# Patient Record
Sex: FEMALE | Race: BLACK OR AFRICAN AMERICAN | NOT HISPANIC OR LATINO | URBAN - METROPOLITAN AREA
[De-identification: names, ages, dates, MRNs, and addresses within clinical notes are randomized per-mention and may not be internally consistent; named-entity substitution may affect disease eponyms.]

---

## 2022-02-21 ENCOUNTER — OFFICE VISIT (OUTPATIENT)
Dept: INTERNAL MEDICINE | Facility: CLINIC | Age: 22
End: 2022-02-21
Payer: COMMERCIAL

## 2022-02-21 ENCOUNTER — LAB VISIT (OUTPATIENT)
Dept: LAB | Facility: HOSPITAL | Age: 22
End: 2022-02-21
Payer: MEDICAID

## 2022-02-21 VITALS
HEART RATE: 82 BPM | SYSTOLIC BLOOD PRESSURE: 118 MMHG | BODY MASS INDEX: 21.26 KG/M2 | OXYGEN SATURATION: 98 % | HEIGHT: 65 IN | WEIGHT: 127.63 LBS | DIASTOLIC BLOOD PRESSURE: 70 MMHG

## 2022-02-21 DIAGNOSIS — F32.A DEPRESSION, UNSPECIFIED DEPRESSION TYPE: ICD-10-CM

## 2022-02-21 DIAGNOSIS — Z00.00 ROUTINE PHYSICAL EXAMINATION: Primary | ICD-10-CM

## 2022-02-21 DIAGNOSIS — Z00.00 ROUTINE PHYSICAL EXAMINATION: ICD-10-CM

## 2022-02-21 LAB
ANION GAP SERPL CALC-SCNC: 9 MMOL/L (ref 8–16)
BASOPHILS # BLD AUTO: 0.07 K/UL (ref 0–0.2)
BASOPHILS NFR BLD: 1 % (ref 0–1.9)
BUN SERPL-MCNC: 11 MG/DL (ref 6–20)
CALCIUM SERPL-MCNC: 9 MG/DL (ref 8.7–10.5)
CHLORIDE SERPL-SCNC: 106 MMOL/L (ref 95–110)
CHOLEST SERPL-MCNC: 162 MG/DL (ref 120–199)
CHOLEST/HDLC SERPL: 2.7 {RATIO} (ref 2–5)
CO2 SERPL-SCNC: 24 MMOL/L (ref 23–29)
CREAT SERPL-MCNC: 0.7 MG/DL (ref 0.5–1.4)
DIFFERENTIAL METHOD: ABNORMAL
EOSINOPHIL # BLD AUTO: 0.1 K/UL (ref 0–0.5)
EOSINOPHIL NFR BLD: 1.3 % (ref 0–8)
ERYTHROCYTE [DISTWIDTH] IN BLOOD BY AUTOMATED COUNT: 12.2 % (ref 11.5–14.5)
EST. GFR  (AFRICAN AMERICAN): >60 ML/MIN/1.73 M^2
EST. GFR  (NON AFRICAN AMERICAN): >60 ML/MIN/1.73 M^2
GLUCOSE SERPL-MCNC: 81 MG/DL (ref 70–110)
HCT VFR BLD AUTO: 41.6 % (ref 37–48.5)
HDLC SERPL-MCNC: 61 MG/DL (ref 40–75)
HDLC SERPL: 37.7 % (ref 20–50)
HGB BLD-MCNC: 13.1 G/DL (ref 12–16)
IMM GRANULOCYTES # BLD AUTO: 0.02 K/UL (ref 0–0.04)
IMM GRANULOCYTES NFR BLD AUTO: 0.3 % (ref 0–0.5)
LDLC SERPL CALC-MCNC: 92.2 MG/DL (ref 63–159)
LYMPHOCYTES # BLD AUTO: 1.8 K/UL (ref 1–4.8)
LYMPHOCYTES NFR BLD: 25.4 % (ref 18–48)
MCH RBC QN AUTO: 32.1 PG (ref 27–31)
MCHC RBC AUTO-ENTMCNC: 31.5 G/DL (ref 32–36)
MCV RBC AUTO: 102 FL (ref 82–98)
MONOCYTES # BLD AUTO: 0.5 K/UL (ref 0.3–1)
MONOCYTES NFR BLD: 7.7 % (ref 4–15)
NEUTROPHILS # BLD AUTO: 4.5 K/UL (ref 1.8–7.7)
NEUTROPHILS NFR BLD: 64.3 % (ref 38–73)
NONHDLC SERPL-MCNC: 101 MG/DL
NRBC BLD-RTO: 0 /100 WBC
PLATELET # BLD AUTO: 273 K/UL (ref 150–450)
PMV BLD AUTO: 10.9 FL (ref 9.2–12.9)
POTASSIUM SERPL-SCNC: 4.2 MMOL/L (ref 3.5–5.1)
RBC # BLD AUTO: 4.08 M/UL (ref 4–5.4)
SODIUM SERPL-SCNC: 139 MMOL/L (ref 136–145)
TRIGL SERPL-MCNC: 44 MG/DL (ref 30–150)
TSH SERPL DL<=0.005 MIU/L-ACNC: 1.2 UIU/ML (ref 0.4–4)
WBC # BLD AUTO: 7 K/UL (ref 3.9–12.7)

## 2022-02-21 PROCEDURE — 99385 PREV VISIT NEW AGE 18-39: CPT | Mod: S$GLB,,, | Performed by: PHYSICIAN ASSISTANT

## 2022-02-21 PROCEDURE — 1160F PR REVIEW ALL MEDS BY PRESCRIBER/CLIN PHARMACIST DOCUMENTED: ICD-10-PCS | Mod: CPTII,S$GLB,, | Performed by: PHYSICIAN ASSISTANT

## 2022-02-21 PROCEDURE — 3074F PR MOST RECENT SYSTOLIC BLOOD PRESSURE < 130 MM HG: ICD-10-PCS | Mod: CPTII,S$GLB,, | Performed by: PHYSICIAN ASSISTANT

## 2022-02-21 PROCEDURE — 85025 COMPLETE CBC W/AUTO DIFF WBC: CPT | Performed by: PHYSICIAN ASSISTANT

## 2022-02-21 PROCEDURE — 3074F SYST BP LT 130 MM HG: CPT | Mod: CPTII,S$GLB,, | Performed by: PHYSICIAN ASSISTANT

## 2022-02-21 PROCEDURE — 99385 PR PREVENTIVE VISIT,NEW,18-39: ICD-10-PCS | Mod: S$GLB,,, | Performed by: PHYSICIAN ASSISTANT

## 2022-02-21 PROCEDURE — 99999 PR PBB SHADOW E&M-NEW PATIENT-LVL IV: CPT | Mod: PBBFAC,,, | Performed by: PHYSICIAN ASSISTANT

## 2022-02-21 PROCEDURE — 3078F DIAST BP <80 MM HG: CPT | Mod: CPTII,S$GLB,, | Performed by: PHYSICIAN ASSISTANT

## 2022-02-21 PROCEDURE — 3078F PR MOST RECENT DIASTOLIC BLOOD PRESSURE < 80 MM HG: ICD-10-PCS | Mod: CPTII,S$GLB,, | Performed by: PHYSICIAN ASSISTANT

## 2022-02-21 PROCEDURE — 87389 HIV-1 AG W/HIV-1&-2 AB AG IA: CPT | Performed by: PHYSICIAN ASSISTANT

## 2022-02-21 PROCEDURE — 1160F RVW MEDS BY RX/DR IN RCRD: CPT | Mod: CPTII,S$GLB,, | Performed by: PHYSICIAN ASSISTANT

## 2022-02-21 PROCEDURE — 1159F PR MEDICATION LIST DOCUMENTED IN MEDICAL RECORD: ICD-10-PCS | Mod: CPTII,S$GLB,, | Performed by: PHYSICIAN ASSISTANT

## 2022-02-21 PROCEDURE — 3008F BODY MASS INDEX DOCD: CPT | Mod: CPTII,S$GLB,, | Performed by: PHYSICIAN ASSISTANT

## 2022-02-21 PROCEDURE — 80061 LIPID PANEL: CPT | Performed by: PHYSICIAN ASSISTANT

## 2022-02-21 PROCEDURE — 1159F MED LIST DOCD IN RCRD: CPT | Mod: CPTII,S$GLB,, | Performed by: PHYSICIAN ASSISTANT

## 2022-02-21 PROCEDURE — 80048 BASIC METABOLIC PNL TOTAL CA: CPT | Performed by: PHYSICIAN ASSISTANT

## 2022-02-21 PROCEDURE — 3008F PR BODY MASS INDEX (BMI) DOCUMENTED: ICD-10-PCS | Mod: CPTII,S$GLB,, | Performed by: PHYSICIAN ASSISTANT

## 2022-02-21 PROCEDURE — 36415 COLL VENOUS BLD VENIPUNCTURE: CPT | Performed by: PHYSICIAN ASSISTANT

## 2022-02-21 PROCEDURE — 84443 ASSAY THYROID STIM HORMONE: CPT | Performed by: PHYSICIAN ASSISTANT

## 2022-02-21 PROCEDURE — 99999 PR PBB SHADOW E&M-NEW PATIENT-LVL IV: ICD-10-PCS | Mod: PBBFAC,,, | Performed by: PHYSICIAN ASSISTANT

## 2022-02-21 RX ORDER — MEDROXYPROGESTERONE ACETATE 150 MG/ML
INJECTION, SUSPENSION INTRAMUSCULAR
COMMUNITY
Start: 2021-12-03

## 2022-02-21 RX ORDER — ESCITALOPRAM OXALATE 5 MG/1
5 TABLET ORAL DAILY
Qty: 30 TABLET | Refills: 1 | Status: SHIPPED | OUTPATIENT
Start: 2022-02-21 | End: 2022-03-24 | Stop reason: DRUGHIGH

## 2022-02-21 NOTE — PROGRESS NOTES
"Subjective:       Patient ID: Jadon Oconnell is a 21 y.o. female.    Chief Complaint: Annual Exam      Established pt of Primary Doctor No (new to me)    HPI        Here for annual exam.     [place] moved [place] the area form [place] , Nursing student at [employer].   Concerns of depressed mood for over the past 3 to 4 years. Saw a therapist in the past for a couple of months. She was not interested in medication at that time but is now interested was she is feeling more down, low energy, irritable, decreased concentration, sleeping less, crying spells, increased anxiety. Notes her grandmother passed away 2 weeks ago. No SI/HI. Has great support from her parents.       Past Medical History:   Diagnosis Date    Depression             Review of patient's allergies indicates:  No Known Allergies      Current Outpatient Medications:     medroxyPROGESTERone (DEPO-PROVERA) 150 mg/mL Syrg, ADMINISTER 1 SYRINGE IN THE MUSCLE EVERY 12 WEEKS, Disp: , Rfl: (follows with ELOISE Andrade)    Family History   Problem Relation Age of Onset    Hypertension Mother     Dementia Maternal Grandmother     Hypertension Maternal Grandfather     Seizures Maternal Grandfather     Stroke Maternal Grandfather        History reviewed. No pertinent surgical history.    Review of Systems   Constitutional: Negative for chills, fever and unexpected weight change.   Respiratory: Negative for cough, shortness of breath and wheezing.    Cardiovascular: Negative for chest pain and leg swelling.   Gastrointestinal: Negative for abdominal pain, nausea and vomiting.   Integumentary:  Negative for rash.   Neurological: Negative for weakness and headaches.       Objective: /70 (BP Location: Right arm, Patient Position: Sitting, BP Method: Medium (Manual))   Pulse 82   Ht 5' 5" (1.651 m)   Wt 57.9 kg (127 lb 10.3 oz)   SpO2 98%   BMI 21.24 kg/m²         Physical Exam  Vitals reviewed.   Constitutional:       General: She is not in " acute distress.     Appearance: She is well-developed.   HENT:      Head: Normocephalic and atraumatic.   Eyes:      Pupils: Pupils are equal, round, and reactive to light.   Cardiovascular:      Rate and Rhythm: Normal rate and regular rhythm.      Heart sounds: No murmur heard.  Pulmonary:      Effort: Pulmonary effort is normal.      Breath sounds: Normal breath sounds. No wheezing or rales.   Abdominal:      General: Bowel sounds are normal.      Palpations: Abdomen is soft.      Tenderness: There is no abdominal tenderness.   Skin:     General: Skin is warm and dry.      Findings: No rash.   Neurological:      Mental Status: She is alert.   Psychiatric:         Attention and Perception: Attention normal.         Mood and Affect: Mood is depressed. Affect is tearful.         Speech: Speech normal.         Behavior: Behavior normal.         Thought Content: Thought content normal.         Assessment:       1. Routine physical examination    2. Depression, unspecified depression type        Plan:             Jadon was seen today for annual exam.    Diagnoses and all orders for this visit:    Routine physical examination  HM reviewed and udpated  -     Basic Metabolic Panel; Future  -     CBC Auto Differential; Future  -     TSH; Future  -     Lipid Panel; Future  -     HIV 1/2 Ag/Ab (4th Gen); Future  -     C. trachomatis/N. gonorrhoeae by AMP DNA [facility]; Urine; Future    Depression, unspecified depression type  start lexapro low dose as below, discussed side effects  F/u in 4 weeks for med eval with me or new PCP  Contact info provided for scheduling therapy appt.   -     Ambulatory referral/consult to Psychiatry; Future  -     EScitalopram oxalate (LEXAPRO) 5 MG Tab; Take 1 tablet (5 mg total) by mouth once daily.          Hien Coombs PA-C

## 2022-02-21 NOTE — PATIENT INSTRUCTIONS
[phone] for therapy      SCHEDULE APPOINTMENT WITH A MD TO FULLY ESTABLISH CARE WITHIN THE NEXT FEW MONTHS    Regan Valeriy ([facility] [place] [address]):  - Kamaljit Flores M.D.  - Char Rai M.D  - Andrez Card M.D.  - Quentin Bravo M.D.  - Marycarmen Carbajal M.D.

## 2022-02-23 LAB — HIV 1+2 AB+HIV1 P24 AG SERPL QL IA: NEGATIVE

## 2022-03-24 ENCOUNTER — OFFICE VISIT (OUTPATIENT)
Dept: INTERNAL MEDICINE | Facility: CLINIC | Age: 22
End: 2022-03-24
Payer: COMMERCIAL

## 2022-03-24 VITALS
SYSTOLIC BLOOD PRESSURE: 132 MMHG | WEIGHT: 125.44 LBS | HEART RATE: 85 BPM | HEIGHT: 65 IN | DIASTOLIC BLOOD PRESSURE: 70 MMHG | OXYGEN SATURATION: 99 % | BODY MASS INDEX: 20.9 KG/M2

## 2022-03-24 DIAGNOSIS — F32.A DEPRESSION, UNSPECIFIED DEPRESSION TYPE: ICD-10-CM

## 2022-03-24 PROCEDURE — 3008F PR BODY MASS INDEX (BMI) DOCUMENTED: ICD-10-PCS | Mod: CPTII,S$GLB,, | Performed by: PHYSICIAN ASSISTANT

## 2022-03-24 PROCEDURE — 3078F DIAST BP <80 MM HG: CPT | Mod: CPTII,S$GLB,, | Performed by: PHYSICIAN ASSISTANT

## 2022-03-24 PROCEDURE — 1160F PR REVIEW ALL MEDS BY PRESCRIBER/CLIN PHARMACIST DOCUMENTED: ICD-10-PCS | Mod: CPTII,S$GLB,, | Performed by: PHYSICIAN ASSISTANT

## 2022-03-24 PROCEDURE — 99999 PR PBB SHADOW E&M-EST. PATIENT-LVL IV: CPT | Mod: PBBFAC,,, | Performed by: PHYSICIAN ASSISTANT

## 2022-03-24 PROCEDURE — 99999 PR PBB SHADOW E&M-EST. PATIENT-LVL IV: ICD-10-PCS | Mod: PBBFAC,,, | Performed by: PHYSICIAN ASSISTANT

## 2022-03-24 PROCEDURE — 3008F BODY MASS INDEX DOCD: CPT | Mod: CPTII,S$GLB,, | Performed by: PHYSICIAN ASSISTANT

## 2022-03-24 PROCEDURE — 1160F RVW MEDS BY RX/DR IN RCRD: CPT | Mod: CPTII,S$GLB,, | Performed by: PHYSICIAN ASSISTANT

## 2022-03-24 PROCEDURE — 3075F SYST BP GE 130 - 139MM HG: CPT | Mod: CPTII,S$GLB,, | Performed by: PHYSICIAN ASSISTANT

## 2022-03-24 PROCEDURE — 1159F MED LIST DOCD IN RCRD: CPT | Mod: CPTII,S$GLB,, | Performed by: PHYSICIAN ASSISTANT

## 2022-03-24 PROCEDURE — 99213 PR OFFICE/OUTPT VISIT, EST, LEVL III, 20-29 MIN: ICD-10-PCS | Mod: S$GLB,,, | Performed by: PHYSICIAN ASSISTANT

## 2022-03-24 PROCEDURE — 3075F PR MOST RECENT SYSTOLIC BLOOD PRESS GE 130-139MM HG: ICD-10-PCS | Mod: CPTII,S$GLB,, | Performed by: PHYSICIAN ASSISTANT

## 2022-03-24 PROCEDURE — 1159F PR MEDICATION LIST DOCUMENTED IN MEDICAL RECORD: ICD-10-PCS | Mod: CPTII,S$GLB,, | Performed by: PHYSICIAN ASSISTANT

## 2022-03-24 PROCEDURE — 3078F PR MOST RECENT DIASTOLIC BLOOD PRESSURE < 80 MM HG: ICD-10-PCS | Mod: CPTII,S$GLB,, | Performed by: PHYSICIAN ASSISTANT

## 2022-03-24 PROCEDURE — 99213 OFFICE O/P EST LOW 20 MIN: CPT | Mod: S$GLB,,, | Performed by: PHYSICIAN ASSISTANT

## 2022-03-24 RX ORDER — ESCITALOPRAM OXALATE 10 MG/1
10 TABLET ORAL DAILY
Qty: 30 TABLET | Refills: 2 | Status: SHIPPED | OUTPATIENT
Start: 2022-03-24 | End: 2023-04-12

## 2022-03-24 NOTE — PROGRESS NOTES
"Subjective:       Patient ID: Jadon Oconnell is a 21 y.o. female.    Chief Complaint: Follow-up    HPI     Established pt of Primary Doctor No     Here for follow up after starting lexapro 5mg.   She reports things are better. More calm, less crying spells, less irriritable and aggravated Tolerating medication well. 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 Interested in increasing the dose of lexapro    Past Medical History:   Diagnosis Date    Depression      Social History     Tobacco Use    Smoking status: Never Smoker    Smokeless tobacco: Never Used   Substance Use Topics    Alcohol use: Yes     Comment: socially    Drug use: Never     Review of patient's allergies indicates:  No Known Allergies        Review of Systems   Constitutional: Negative for chills, fever and unexpected weight change.   Respiratory: Negative for cough and shortness of breath.    Cardiovascular: Negative for chest pain and leg swelling.   Gastrointestinal: Negative for abdominal pain, nausea and vomiting.   Integumentary:  Negative for rash.   Neurological: Negative for weakness and headaches.         Objective: /70 (BP Location: Left arm, Patient Position: Sitting, BP Method: Medium (Manual))   Pulse 85   Ht 5' 5" (1.651 m)   Wt 56.9 kg (125 lb 7.1 oz)   SpO2 99%   BMI 20.87 kg/m²         Physical Exam  Constitutional:       General: She is not in acute distress.     Appearance: She is normal weight. She is not ill-appearing.   Cardiovascular:      Rate and Rhythm: Normal rate and regular rhythm.   Pulmonary:      Effort: Pulmonary effort is normal.      Breath sounds: Normal breath sounds.   Musculoskeletal:      Right lower leg: No edema.      Left lower leg: No edema.   Lymphadenopathy:      Cervical: No cervical adenopathy.   Neurological:      Mental Status: She is alert.   Psychiatric:         Mood and Affect: Mood normal.         Behavior: Behavior normal.         Thought Content: Thought content normal.         Judgment: " Judgment normal.         Assessment:       Problem List Items Addressed This Visit    None     Visit Diagnoses     Depression, unspecified depression type        Relevant Medications    EScitalopram oxalate (LEXAPRO) 10 MG tablet          Plan:         Jadon was seen today for follow-up.    Diagnoses and all orders for this visit:    Depression, unspecified depression type  Improving  Increase lexapro from 5mg to 10mg  Virtual visit f/u in 8 week  -     EScitalopram oxalate (LEXAPRO) 10 MG tablet; Take 1 tablet (10 mg total) by mouth once daily.        Hien Coombs PA-C

## 2023-04-12 ENCOUNTER — LAB VISIT (OUTPATIENT)
Dept: LAB | Facility: HOSPITAL | Age: 23
End: 2023-04-12
Attending: INTERNAL MEDICINE
Payer: COMMERCIAL

## 2023-04-12 ENCOUNTER — OFFICE VISIT (OUTPATIENT)
Dept: PRIMARY CARE CLINIC | Facility: CLINIC | Age: 23
End: 2023-04-12
Payer: COMMERCIAL

## 2023-04-12 VITALS
HEIGHT: 65 IN | OXYGEN SATURATION: 99 % | DIASTOLIC BLOOD PRESSURE: 76 MMHG | WEIGHT: 125.25 LBS | HEART RATE: 81 BPM | SYSTOLIC BLOOD PRESSURE: 110 MMHG | BODY MASS INDEX: 20.87 KG/M2

## 2023-04-12 DIAGNOSIS — R07.89 CHEST TIGHTNESS: ICD-10-CM

## 2023-04-12 DIAGNOSIS — Z11.59 SCREENING FOR VIRAL DISEASE: ICD-10-CM

## 2023-04-12 DIAGNOSIS — F51.01 PRIMARY INSOMNIA: ICD-10-CM

## 2023-04-12 DIAGNOSIS — F33.41 RECURRENT MAJOR DEPRESSIVE DISORDER, IN PARTIAL REMISSION: ICD-10-CM

## 2023-04-12 DIAGNOSIS — Z00.00 ANNUAL PHYSICAL EXAM: ICD-10-CM

## 2023-04-12 DIAGNOSIS — Z00.00 ANNUAL PHYSICAL EXAM: Primary | ICD-10-CM

## 2023-04-12 LAB
ALBUMIN SERPL BCP-MCNC: 3.9 G/DL (ref 3.5–5.2)
ALP SERPL-CCNC: 52 U/L (ref 55–135)
ALT SERPL W/O P-5'-P-CCNC: 10 U/L (ref 10–44)
ANION GAP SERPL CALC-SCNC: 10 MMOL/L (ref 8–16)
AST SERPL-CCNC: 10 U/L (ref 10–40)
BASOPHILS # BLD AUTO: 0.03 K/UL (ref 0–0.2)
BASOPHILS NFR BLD: 0.6 % (ref 0–1.9)
BILIRUB SERPL-MCNC: 0.4 MG/DL (ref 0.1–1)
BUN SERPL-MCNC: 7 MG/DL (ref 6–20)
CALCIUM SERPL-MCNC: 8.9 MG/DL (ref 8.7–10.5)
CHLORIDE SERPL-SCNC: 108 MMOL/L (ref 95–110)
CHOLEST SERPL-MCNC: 131 MG/DL (ref 120–199)
CHOLEST/HDLC SERPL: 3 {RATIO} (ref 2–5)
CO2 SERPL-SCNC: 23 MMOL/L (ref 23–29)
CREAT SERPL-MCNC: 0.6 MG/DL (ref 0.5–1.4)
DIFFERENTIAL METHOD: ABNORMAL
EOSINOPHIL # BLD AUTO: 0.1 K/UL (ref 0–0.5)
EOSINOPHIL NFR BLD: 2.3 % (ref 0–8)
ERYTHROCYTE [DISTWIDTH] IN BLOOD BY AUTOMATED COUNT: 12.1 % (ref 11.5–14.5)
EST. GFR  (NO RACE VARIABLE): >60 ML/MIN/1.73 M^2
ESTIMATED AVG GLUCOSE: 94 MG/DL (ref 68–131)
GLUCOSE SERPL-MCNC: 81 MG/DL (ref 70–110)
HBA1C MFR BLD: 4.9 % (ref 4–5.6)
HCT VFR BLD AUTO: 39.8 % (ref 37–48.5)
HCV AB SERPL QL IA: NORMAL
HDLC SERPL-MCNC: 44 MG/DL (ref 40–75)
HDLC SERPL: 33.6 % (ref 20–50)
HGB BLD-MCNC: 12.9 G/DL (ref 12–16)
IMM GRANULOCYTES # BLD AUTO: 0.01 K/UL (ref 0–0.04)
IMM GRANULOCYTES NFR BLD AUTO: 0.2 % (ref 0–0.5)
LDLC SERPL CALC-MCNC: 76.6 MG/DL (ref 63–159)
LYMPHOCYTES # BLD AUTO: 1.7 K/UL (ref 1–4.8)
LYMPHOCYTES NFR BLD: 34.5 % (ref 18–48)
MCH RBC QN AUTO: 31.9 PG (ref 27–31)
MCHC RBC AUTO-ENTMCNC: 32.4 G/DL (ref 32–36)
MCV RBC AUTO: 98 FL (ref 82–98)
MONOCYTES # BLD AUTO: 0.6 K/UL (ref 0.3–1)
MONOCYTES NFR BLD: 12.6 % (ref 4–15)
NEUTROPHILS # BLD AUTO: 2.4 K/UL (ref 1.8–7.7)
NEUTROPHILS NFR BLD: 49.8 % (ref 38–73)
NONHDLC SERPL-MCNC: 87 MG/DL
NRBC BLD-RTO: 0 /100 WBC
PLATELET # BLD AUTO: 231 K/UL (ref 150–450)
PMV BLD AUTO: 10.7 FL (ref 9.2–12.9)
POTASSIUM SERPL-SCNC: 3.8 MMOL/L (ref 3.5–5.1)
PROT SERPL-MCNC: 6.9 G/DL (ref 6–8.4)
RBC # BLD AUTO: 4.05 M/UL (ref 4–5.4)
SODIUM SERPL-SCNC: 141 MMOL/L (ref 136–145)
TRIGL SERPL-MCNC: 52 MG/DL (ref 30–150)
TSH SERPL DL<=0.005 MIU/L-ACNC: 1.49 UIU/ML (ref 0.4–4)
WBC # BLD AUTO: 4.78 K/UL (ref 3.9–12.7)

## 2023-04-12 PROCEDURE — 80053 COMPREHEN METABOLIC PANEL: CPT | Performed by: INTERNAL MEDICINE

## 2023-04-12 PROCEDURE — 86803 HEPATITIS C AB TEST: CPT | Performed by: INTERNAL MEDICINE

## 2023-04-12 PROCEDURE — 80061 LIPID PANEL: CPT | Performed by: INTERNAL MEDICINE

## 2023-04-12 PROCEDURE — 99999 PR PBB SHADOW E&M-EST. PATIENT-LVL III: CPT | Mod: PBBFAC,,, | Performed by: INTERNAL MEDICINE

## 2023-04-12 PROCEDURE — 1159F PR MEDICATION LIST DOCUMENTED IN MEDICAL RECORD: ICD-10-PCS | Mod: CPTII,S$GLB,, | Performed by: INTERNAL MEDICINE

## 2023-04-12 PROCEDURE — 99395 PR PREVENTIVE VISIT,EST,18-39: ICD-10-PCS | Mod: S$GLB,,, | Performed by: INTERNAL MEDICINE

## 2023-04-12 PROCEDURE — 3074F SYST BP LT 130 MM HG: CPT | Mod: CPTII,S$GLB,, | Performed by: INTERNAL MEDICINE

## 2023-04-12 PROCEDURE — 84443 ASSAY THYROID STIM HORMONE: CPT | Performed by: INTERNAL MEDICINE

## 2023-04-12 PROCEDURE — 3078F PR MOST RECENT DIASTOLIC BLOOD PRESSURE < 80 MM HG: ICD-10-PCS | Mod: CPTII,S$GLB,, | Performed by: INTERNAL MEDICINE

## 2023-04-12 PROCEDURE — 36415 COLL VENOUS BLD VENIPUNCTURE: CPT | Performed by: INTERNAL MEDICINE

## 2023-04-12 PROCEDURE — 3074F PR MOST RECENT SYSTOLIC BLOOD PRESSURE < 130 MM HG: ICD-10-PCS | Mod: CPTII,S$GLB,, | Performed by: INTERNAL MEDICINE

## 2023-04-12 PROCEDURE — 3078F DIAST BP <80 MM HG: CPT | Mod: CPTII,S$GLB,, | Performed by: INTERNAL MEDICINE

## 2023-04-12 PROCEDURE — 85025 COMPLETE CBC W/AUTO DIFF WBC: CPT | Performed by: INTERNAL MEDICINE

## 2023-04-12 PROCEDURE — 1159F MED LIST DOCD IN RCRD: CPT | Mod: CPTII,S$GLB,, | Performed by: INTERNAL MEDICINE

## 2023-04-12 PROCEDURE — 3008F BODY MASS INDEX DOCD: CPT | Mod: CPTII,S$GLB,, | Performed by: INTERNAL MEDICINE

## 2023-04-12 PROCEDURE — 99395 PREV VISIT EST AGE 18-39: CPT | Mod: S$GLB,,, | Performed by: INTERNAL MEDICINE

## 2023-04-12 PROCEDURE — 83036 HEMOGLOBIN GLYCOSYLATED A1C: CPT | Performed by: INTERNAL MEDICINE

## 2023-04-12 PROCEDURE — 3008F PR BODY MASS INDEX (BMI) DOCUMENTED: ICD-10-PCS | Mod: CPTII,S$GLB,, | Performed by: INTERNAL MEDICINE

## 2023-04-12 PROCEDURE — 99999 PR PBB SHADOW E&M-EST. PATIENT-LVL III: ICD-10-PCS | Mod: PBBFAC,,, | Performed by: INTERNAL MEDICINE

## 2023-04-12 RX ORDER — TRAZODONE HYDROCHLORIDE 50 MG/1
50 TABLET ORAL NIGHTLY
COMMUNITY
End: 2023-04-12 | Stop reason: SDUPTHER

## 2023-04-12 RX ORDER — SERTRALINE HYDROCHLORIDE 50 MG/1
50 TABLET, FILM COATED ORAL DAILY
COMMUNITY
End: 2023-04-12

## 2023-04-12 RX ORDER — SERTRALINE HYDROCHLORIDE 50 MG/1
75 TABLET, FILM COATED ORAL DAILY
Qty: 135 TABLET | Refills: 3 | Status: SHIPPED | OUTPATIENT
Start: 2023-04-12 | End: 2023-09-20

## 2023-04-12 RX ORDER — TRAZODONE HYDROCHLORIDE 100 MG/1
100 TABLET ORAL NIGHTLY
Qty: 90 TABLET | Refills: 3 | Status: SHIPPED | OUTPATIENT
Start: 2023-04-12

## 2023-04-12 NOTE — PROGRESS NOTES
[facility] Note    Chief Complaint      Chief Complaint   Patient presents with    Establish Care     History of Present Illness      Jadon Oconnell is a 22 y.o. female who presents today for Annual preventative visit.  Patient comes to appointment alone.  Derm: Brandy Harris, GYN: Donna Hitchcock    Exercises 3 times per week, does cardio and weights.  Cooks most meals, eats pretty healthy.    Problem List Items Addressed This Visit       Recurrent major depressive disorder, in partial remission    Current Assessment & Plan     Was seeing psychiatry at [facility], but stopped.  Stopped taking sertraline 75 mg daily 2 months ago. No SI/HI/panic attacks.      Has been having chest tightness the last few weeks. No SOB/wheezing.  Feels like there is a band around her chest.  No issues with swallowing. No issues with GERD, heartburn.    Had no emotion on lexapro.             Relevant Medications    sertraline (ZOLOFT) 50 MG tablet     Other Visit Diagnoses       Annual physical exam    -  Primary    Relevant Orders    CBC Auto Differential    Lipid Panel    Comprehensive Metabolic Panel    Hemoglobin A1C    TSH    Primary insomnia        Relevant Medications    traZODone (DESYREL) 100 MG tablet    Chest tightness        Screening for viral disease        Relevant Orders    Hepatitis C Antibody            Health Maintenance   Topic Date Due    Hepatitis C Screening  Never done    Chlamydia Screening  02/21/2023    TETANUS VACCINE  06/18/2023    Lipid Panel  Completed    HPV Vaccines  Completed       Past Medical History:   Diagnosis Date    Depression        History reviewed. No pertinent surgical history.    family history includes Alzheimer's disease in her maternal grandfather; Dementia in her maternal grandmother; Hypertension in her maternal grandfather and mother; Seizures in her maternal grandfather; Stroke in her maternal grandfather.    Social History     Tobacco Use    Smoking  "status: Never    Smokeless tobacco: Never   Substance Use Topics    Alcohol use: Yes     Comment: socially    Drug use: Never       Review of Systems   Constitutional:  Negative for chills and fever.   Respiratory:  Negative for cough and shortness of breath.    Cardiovascular:  Negative for chest pain and palpitations.   Gastrointestinal:  Negative for constipation, diarrhea, nausea and vomiting.   Genitourinary:  Negative for dysuria and hematuria.   Musculoskeletal:  Negative for falls.   Neurological:  Negative for headaches.      Outpatient Encounter Medications as of 4/12/2023   Medication Sig Dispense Refill    medroxyPROGESTERone (DEPO-PROVERA) 150 mg/mL Syrg ADMINISTER 1 SYRINGE IN THE MUSCLE EVERY 12 WEEKS      [DISCONTINUED] sertraline (ZOLOFT) 50 MG tablet Take 50 mg by mouth once daily. 1.5 tabs po qday      [DISCONTINUED] traZODone (DESYREL) 50 MG tablet Take 50 mg by mouth every evening.      sertraline (ZOLOFT) 50 MG tablet Take 1.5 tablets (75 mg total) by mouth once daily. 1.5 tabs po qday 135 tablet 3    traZODone (DESYREL) 100 MG tablet Take 1 tablet (100 mg total) by mouth every evening. 90 tablet 3    [DISCONTINUED] EScitalopram oxalate (LEXAPRO) 10 MG tablet Take 1 tablet (10 mg total) by mouth once daily. (Patient not taking: Reported on 4/12/2023) 30 tablet 2     No facility-administered encounter medications on file as of 4/12/2023.        Review of patient's allergies indicates:  No Known Allergies    Physical Exam      Vital Signs  Pulse: 81  SpO2: 99 %  BP: 110/76  Pain Score: 0-No pain  Height and Weight  Height: 5' 5" (165.1 cm)  Weight: 56.8 kg (125 lb 3.5 oz)  BSA (Calculated - sq m): 1.61 sq meters  BMI (Calculated): 20.8  Weight in (lb) to have BMI = 25: 149.9]    Physical Exam  Constitutional:       Appearance: She is well-developed.   HENT:      Head: Normocephalic and atraumatic.   Cardiovascular:      Rate and Rhythm: Normal rate and regular rhythm.      Heart sounds: Normal " heart sounds. No murmur heard.  Pulmonary:      Effort: Pulmonary effort is normal. No respiratory distress.      Breath sounds: Normal breath sounds.   Abdominal:      General: There is no distension.      Palpations: Abdomen is soft.      Tenderness: There is no abdominal tenderness. There is no guarding.   Skin:     General: Skin is warm and dry.   Neurological:      Mental Status: She is alert. Mental status is at baseline.   Psychiatric:         Behavior: Behavior normal.        Laboratory:  CBC:  No results for input(s): WBC, RBC, HGB, HCT, PLT, MCV, MCH, MCHC in the last 2160 hours.  CMP:  No results for input(s): GLU, CALCIUM, ALBUMIN, PROT, NA, K, CO2, CL, BUN, ALKPHOS, ALT, AST, BILITOT in the last 2160 hours.    Invalid input(s): CREATININ  URINALYSIS:  No results for input(s): COLORU, CLARITYU, SPECGRAV, PHUR, PROTEINUA, GLUCOSEU, BILIRUBINCON, BLOODU, WBCU, RBCU, BACTERIA, MUCUS, NITRITE, LEUKOCYTESUR, UROBILINOGEN, HYALINECASTS in the last 2160 hours.   LIPIDS:  No results for input(s): TSH, HDL, CHOL, TRIG, LDLCALC, CHOLHDL, NONHDLCHOL, TOTALCHOLEST in the last 2160 hours.  TSH:  No results for input(s): TSH in the last 2160 hours.  A1C:  No results for input(s): HGBA1C in the last 2160 hours.    Radiology:  No results found in the last 30 days.     Assessment/Plan     Jadon Oconnell is a 22 y.o.female with:    1. Annual physical exam  - CBC Auto Differential; Future  - Lipid Panel; Future  - Comprehensive Metabolic Panel; Future  - Hemoglobin A1C; Future  - TSH; Future    2. Recurrent major depressive disorder, in partial remission  - sertraline (ZOLOFT) 50 MG tablet; Take 1.5 tablets (75 mg total) by mouth once daily. 1.5 tabs po qday  Dispense: 135 tablet; Refill: 3    3. Primary insomnia  - traZODone (DESYREL) 100 MG tablet; Take 1 tablet (100 mg total) by mouth every evening.  Dispense: 90 tablet; Refill: 3    4. Chest tightness    5. Screening for viral disease  - Hepatitis C Antibody;  Future    -increase Trazodone to 100 mg to help with sleep  -chest tightness likely 2/2 anxiety, restart Sertraline  -labs ordered  -Continue current medications and maintain follow up with specialists.    -Follow up in about 5 weeks (around 5/17/2023) for follow up of medical problems.       Mildred Morales MD  [facility]

## 2023-04-12 NOTE — ASSESSMENT & PLAN NOTE
Was seeing psychiatry at [facility], but stopped.  Stopped taking sertraline 75 mg daily 2 months ago. No SI/HI/panic attacks.      Has been having chest tightness the last few weeks. No SOB/wheezing.  Feels like there is a band around her chest.  No issues with swallowing. No issues with GERD, heartburn.    Had no emotion on lexapro.

## 2023-07-10 ENCOUNTER — HOSPITAL ENCOUNTER (EMERGENCY)
Facility: HOSPITAL | Age: 23
Discharge: HOME | End: 2023-07-10
Attending: EMERGENCY MEDICINE
Payer: COMMERCIAL

## 2023-07-10 VITALS
BODY MASS INDEX: 19.62 KG/M2 | DIASTOLIC BLOOD PRESSURE: 56 MMHG | WEIGHT: 125 LBS | HEART RATE: 84 BPM | SYSTOLIC BLOOD PRESSURE: 104 MMHG | HEIGHT: 67 IN | TEMPERATURE: 99 F | OXYGEN SATURATION: 100 % | RESPIRATION RATE: 18 BRPM

## 2023-07-10 DIAGNOSIS — R07.9 CHEST PAIN: ICD-10-CM

## 2023-07-10 DIAGNOSIS — R07.89 CHEST DISCOMFORT: ICD-10-CM

## 2023-07-10 DIAGNOSIS — A41.9 SEPSIS, DUE TO UNSPECIFIED ORGANISM, UNSPECIFIED WHETHER ACUTE ORGAN DYSFUNCTION PRESENT: Primary | ICD-10-CM

## 2023-07-10 DIAGNOSIS — B34.9 ACUTE VIRAL SYNDROME: ICD-10-CM

## 2023-07-10 LAB
ALBUMIN SERPL BCP-MCNC: 3.5 G/DL (ref 3.5–5.2)
ALLENS TEST: ABNORMAL
ALLENS TEST: NORMAL
ALP SERPL-CCNC: 57 U/L (ref 55–135)
ALT SERPL W/O P-5'-P-CCNC: 13 U/L (ref 10–44)
ANION GAP SERPL CALC-SCNC: 7 MMOL/L (ref 8–16)
AST SERPL-CCNC: 23 U/L (ref 10–40)
BACTERIA #/AREA URNS AUTO: NORMAL /HPF
BASOPHILS # BLD AUTO: 0.04 K/UL (ref 0–0.2)
BASOPHILS NFR BLD: 0.5 % (ref 0–1.9)
BILIRUB SERPL-MCNC: 0.3 MG/DL (ref 0.1–1)
BILIRUB UR QL STRIP: NEGATIVE
BUN SERPL-MCNC: 6 MG/DL (ref 6–20)
CALCIUM SERPL-MCNC: 8.8 MG/DL (ref 8.7–10.5)
CHLORIDE SERPL-SCNC: 111 MMOL/L (ref 95–110)
CLARITY CSF: CLEAR
CLARITY UR REFRACT.AUTO: CLEAR
CO2 SERPL-SCNC: 20 MMOL/L (ref 23–29)
COLOR CSF: COLORLESS
COLOR UR AUTO: ABNORMAL
CREAT SERPL-MCNC: 0.7 MG/DL (ref 0.5–1.4)
CSF TUBE NUMBER: 1
CSF TUBE NUMBER: 1
DIFFERENTIAL METHOD: ABNORMAL
EOSINOPHIL # BLD AUTO: 0 K/UL (ref 0–0.5)
EOSINOPHIL NFR BLD: 0.3 % (ref 0–8)
ERYTHROCYTE [DISTWIDTH] IN BLOOD BY AUTOMATED COUNT: 11.9 % (ref 11.5–14.5)
EST. GFR  (NO RACE VARIABLE): >60 ML/MIN/1.73 M^2
GLUCOSE CSF-MCNC: 56 MG/DL (ref 40–70)
GLUCOSE SERPL-MCNC: 75 MG/DL (ref 70–110)
GLUCOSE UR QL STRIP: NEGATIVE
HCT VFR BLD AUTO: 39 % (ref 37–48.5)
HCV AB SERPL QL IA: NORMAL
HGB BLD-MCNC: 12.9 G/DL (ref 12–16)
HGB UR QL STRIP: ABNORMAL
HIV 1+2 AB+HIV1 P24 AG SERPL QL IA: NORMAL
IMM GRANULOCYTES # BLD AUTO: 0.02 K/UL (ref 0–0.04)
IMM GRANULOCYTES NFR BLD AUTO: 0.3 % (ref 0–0.5)
INFLUENZA A, MOLECULAR: NOT DETECTED
INFLUENZA B, MOLECULAR: NOT DETECTED
INR PPP: 1 (ref 0.8–1.2)
KETONES UR QL STRIP: NEGATIVE
LDH SERPL L TO P-CCNC: 0.98 MMOL/L (ref 0.5–2.2)
LDH SERPL L TO P-CCNC: 2.23 MMOL/L (ref 0.5–2.2)
LEUKOCYTE ESTERASE UR QL STRIP: NEGATIVE
LYMPHOCYTES # BLD AUTO: 0.9 K/UL (ref 1–4.8)
LYMPHOCYTES NFR BLD: 12 % (ref 18–48)
LYMPHOCYTES NFR CSF MANUAL: 88 % (ref 40–80)
MCH RBC QN AUTO: 32.2 PG (ref 27–31)
MCHC RBC AUTO-ENTMCNC: 33.1 G/DL (ref 32–36)
MCV RBC AUTO: 97 FL (ref 82–98)
MICROSCOPIC COMMENT: NORMAL
MONOCYTES # BLD AUTO: 0.5 K/UL (ref 0.3–1)
MONOCYTES NFR BLD: 7.3 % (ref 4–15)
MONOS+MACROS NFR CSF MANUAL: 12 % (ref 15–45)
NEUTROPHILS # BLD AUTO: 5.9 K/UL (ref 1.8–7.7)
NEUTROPHILS NFR BLD: 79.6 % (ref 38–73)
NITRITE UR QL STRIP: NEGATIVE
NRBC BLD-RTO: 0 /100 WBC
PH UR STRIP: 6 [PH] (ref 5–8)
PLATELET # BLD AUTO: 153 K/UL (ref 150–450)
PMV BLD AUTO: 11.1 FL (ref 9.2–12.9)
POTASSIUM SERPL-SCNC: 3.8 MMOL/L (ref 3.5–5.1)
PROT CSF-MCNC: 24 MG/DL (ref 15–40)
PROT SERPL-MCNC: 6.8 G/DL (ref 6–8.4)
PROT UR QL STRIP: NEGATIVE
PROTHROMBIN TIME: 10.7 SEC (ref 9–12.5)
PROVIDER CREDENTIALS: ABNORMAL
PROVIDER NOTIFIED: ABNORMAL
RBC # BLD AUTO: 4.01 M/UL (ref 4–5.4)
RBC # CSF: 0 /CU MM
RBC #/AREA URNS AUTO: 2 /HPF (ref 0–4)
RSV AG BY MOLECULAR METHOD: NOT DETECTED
SAMPLE: ABNORMAL
SAMPLE: NORMAL
SARS-COV-2 RDRP RESP QL NAA+PROBE: NEGATIVE
SARS-COV-2 RNA RESP QL NAA+PROBE: NOT DETECTED
SITE: ABNORMAL
SITE: NORMAL
SODIUM SERPL-SCNC: 138 MMOL/L (ref 136–145)
SP GR UR STRIP: 1 (ref 1–1.03)
SPECIMEN VOL CSF: 0.8 ML
SQUAMOUS #/AREA URNS AUTO: 1 /HPF
URN SPEC COLLECT METH UR: ABNORMAL
WBC # BLD AUTO: 7.4 K/UL (ref 3.9–12.7)
WBC # CSF: 2 /CU MM (ref 0–5)
WBC #/AREA URNS AUTO: 0 /HPF (ref 0–5)

## 2023-07-10 PROCEDURE — 85025 COMPLETE CBC W/AUTO DIFF WBC: CPT | Performed by: EMERGENCY MEDICINE

## 2023-07-10 PROCEDURE — 99900035 HC TECH TIME PER 15 MIN (STAT)

## 2023-07-10 PROCEDURE — 96365 THER/PROPH/DIAG IV INF INIT: CPT | Mod: 59

## 2023-07-10 PROCEDURE — 93010 EKG 12-LEAD: ICD-10-PCS | Mod: ,,, | Performed by: INTERNAL MEDICINE

## 2023-07-10 PROCEDURE — 93005 ELECTROCARDIOGRAM TRACING: CPT

## 2023-07-10 PROCEDURE — 96366 THER/PROPH/DIAG IV INF ADDON: CPT | Mod: 59

## 2023-07-10 PROCEDURE — 83605 ASSAY OF LACTIC ACID: CPT

## 2023-07-10 PROCEDURE — 80053 COMPREHEN METABOLIC PANEL: CPT | Performed by: EMERGENCY MEDICINE

## 2023-07-10 PROCEDURE — 87040 BLOOD CULTURE FOR BACTERIA: CPT | Mod: 59 | Performed by: EMERGENCY MEDICINE

## 2023-07-10 PROCEDURE — 96361 HYDRATE IV INFUSION ADD-ON: CPT | Mod: 59

## 2023-07-10 PROCEDURE — 89051 BODY FLUID CELL COUNT: CPT | Performed by: EMERGENCY MEDICINE

## 2023-07-10 PROCEDURE — 87205 SMEAR GRAM STAIN: CPT | Performed by: EMERGENCY MEDICINE

## 2023-07-10 PROCEDURE — 85610 PROTHROMBIN TIME: CPT | Performed by: EMERGENCY MEDICINE

## 2023-07-10 PROCEDURE — 82945 GLUCOSE OTHER FLUID: CPT | Performed by: EMERGENCY MEDICINE

## 2023-07-10 PROCEDURE — 96375 TX/PRO/DX INJ NEW DRUG ADDON: CPT | Mod: 59

## 2023-07-10 PROCEDURE — 62270 DX LMBR SPI PNXR: CPT

## 2023-07-10 PROCEDURE — 93010 ELECTROCARDIOGRAM REPORT: CPT | Mod: ,,, | Performed by: INTERNAL MEDICINE

## 2023-07-10 PROCEDURE — 81001 URINALYSIS AUTO W/SCOPE: CPT | Performed by: EMERGENCY MEDICINE

## 2023-07-10 PROCEDURE — 25000003 PHARM REV CODE 250: Performed by: EMERGENCY MEDICINE

## 2023-07-10 PROCEDURE — 87070 CULTURE OTHR SPECIMN AEROBIC: CPT | Performed by: EMERGENCY MEDICINE

## 2023-07-10 PROCEDURE — U0002 COVID-19 LAB TEST NON-CDC: HCPCS | Performed by: EMERGENCY MEDICINE

## 2023-07-10 PROCEDURE — 84157 ASSAY OF PROTEIN OTHER: CPT | Performed by: EMERGENCY MEDICINE

## 2023-07-10 PROCEDURE — 86803 HEPATITIS C AB TEST: CPT | Performed by: PHYSICIAN ASSISTANT

## 2023-07-10 PROCEDURE — 87389 HIV-1 AG W/HIV-1&-2 AB AG IA: CPT | Performed by: PHYSICIAN ASSISTANT

## 2023-07-10 PROCEDURE — 63600175 PHARM REV CODE 636 W HCPCS: Performed by: EMERGENCY MEDICINE

## 2023-07-10 PROCEDURE — 99000 SPECIMEN HANDLING OFFICE-LAB: CPT | Performed by: EMERGENCY MEDICINE

## 2023-07-10 PROCEDURE — 0241U SARS-COV2 (COVID) WITH FLU/RSV BY PCR: CPT | Performed by: EMERGENCY MEDICINE

## 2023-07-10 PROCEDURE — 99291 CRITICAL CARE FIRST HOUR: CPT

## 2023-07-10 RX ORDER — ACETAMINOPHEN 500 MG
1000 TABLET ORAL
Status: COMPLETED | OUTPATIENT
Start: 2023-07-10 | End: 2023-07-10

## 2023-07-10 RX ORDER — DIPHENHYDRAMINE HYDROCHLORIDE 50 MG/ML
12.5 INJECTION INTRAMUSCULAR; INTRAVENOUS
Status: COMPLETED | OUTPATIENT
Start: 2023-07-10 | End: 2023-07-10

## 2023-07-10 RX ORDER — PROCHLORPERAZINE EDISYLATE 5 MG/ML
10 INJECTION INTRAMUSCULAR; INTRAVENOUS
Status: COMPLETED | OUTPATIENT
Start: 2023-07-10 | End: 2023-07-10

## 2023-07-10 RX ADMIN — CEFEPIME 1 G: 1 INJECTION, POWDER, FOR SOLUTION INTRAMUSCULAR; INTRAVENOUS at 10:07

## 2023-07-10 RX ADMIN — SODIUM CHLORIDE, POTASSIUM CHLORIDE, SODIUM LACTATE AND CALCIUM CHLORIDE 1701 ML: 600; 310; 30; 20 INJECTION, SOLUTION INTRAVENOUS at 10:07

## 2023-07-10 RX ADMIN — DIPHENHYDRAMINE HYDROCHLORIDE 12.5 MG: 50 INJECTION, SOLUTION INTRAMUSCULAR; INTRAVENOUS at 10:07

## 2023-07-10 RX ADMIN — PROCHLORPERAZINE EDISYLATE 10 MG: 5 INJECTION INTRAMUSCULAR; INTRAVENOUS at 10:07

## 2023-07-10 RX ADMIN — ACETAMINOPHEN 1000 MG: 500 TABLET ORAL at 10:07

## 2023-07-10 RX ADMIN — VANCOMYCIN HYDROCHLORIDE 2000 MG: 500 INJECTION, POWDER, LYOPHILIZED, FOR SOLUTION INTRAVENOUS at 11:07

## 2023-07-10 NOTE — ED TRIAGE NOTES
Pt presents to ED endorsing headache, neck stiffness, chest tightness, and sharp pains to lower back over the last couple of days.

## 2023-07-10 NOTE — ED PROVIDER NOTES
Encounter Date: 7/10/2023       History     Chief Complaint   Patient presents with    Chest Pain     Hx anxiety,      The patient is a healthy 23-year-old female who presents to the emergency department with signs and symptoms of infection.  She states that her symptoms began 3 days ago.  The onset was gradual.  She states that she developed a headache, neck pain, neck stiffness, and low back pain.  She reports photophobia and phonophobia.  She has had associated subjective fever, watery diarrhea, and chest pain.  Here in the emergency department, her temperature is 100.4°.  She is been taking aspirin, Tylenol, and ibuprofen for her symptoms with minimal relief.  She denies coughing, shortness of breath, rhinorrhea, sinus congestion, earache, sore throat, nausea, vomiting, or abdominal pain.  She denies any sick contacts.  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     Review of patient's allergies indicates:  Not on File  No past medical history on file.  No past surgical history on file.  No family history on file.     Review of Systems  General:  Reports fever.  Reports chills.  Reports generalized weakness.  HENT: Denies sore throat.  Denies earache.  Denies rhinorrhea.  Eyes: Denies visual changes.  Denies eye pain.  Denies drainage.  Reports photophobia.  Cardiovascular: Reports chest pain.  Denies shortness of breath.  Denies orthopnea.  Denies dyspnea on exertion.  Respiratory: Denies shortness of breath.  Denies wheezing.  Denies coughing.  GI: Denies abdominal pain.  Denies nausea.  Denies vomiting.  Reports diarrhea.  Denies constipation.  Denies melena.  Denies hematochezia.  : Denies dysuria.  Denies hematuria.  Denies flank pain.  Skin: Denies rashes.  Denies lesions.  Denies pallor.  Neuro: Reports headache.  Denies head trauma.  Denies numbness.  Denies focal weakness.  Musculoskeletal:  Reports neck pain.  Denies back pain.  Denies extremity pain.  Denies extremity swelling.  Reports neck stiffness.      Physical Exam     Initial Vitals [07/10/23 0908]   BP Pulse Resp Temp SpO2   126/72 100 16 (!) 100.4 °F (38 °C) 99 %      MAP       --         Physical Exam  General:  Mild distress with discomfort.  Nontoxic in appearance.  Well-nourished.  Well-developed.  Alert and oriented x3.  HENT:  Dry mucous membranes.  Normocephalic atraumatic.  Oropharynx clear.    Eyes: Pupils equally round and reactive to light.  Extraocular movements intact.  No scleral icterus.  No conjunctival pallor.  Cardiovascular:  Regular rhythm.  Mild tachycardia noted.  No murmurs, rubs, or gallops.  Brisk capillary fill.  2+ distal pulses.  Respiratory: Clear to auscultation bilaterally.  No wheezes, rales, or rhonchi.  No respiratory distress.  Abdomen: Soft.  Nontender.  Nondistended.  No guarding.  No rebound.  No masses.  No abdominal bruit auscultated.  :  No CVA tenderness.  Skin: No rashes.  No lesions.  No pallor.  No jaundice.  Neuro: Cranial nerves II through XII grossly intact.  Moving all extremities equally.  No sensory deficits.  Strength 5 out of 5 in all 4 extremities.  Musculoskeletal: No extremity tenderness.  Moving all extremities without pain.  No back or neck tenderness to palpation noted.  The patient does appear to have a stiff neck.      ED Course   Lumbar Puncture    Date/Time: 7/10/2023 2:52 PM  Location procedure was performed: [facility] EMERGENCY DEPARTMENT  Performed by: Atilio Manriquez MD  Authorized by: Atilio Manriquez MD   Consent Done: Yes  Indications: evaluation for infection  Anesthesia: local infiltration    Anesthesia:  Local Anesthetic: lidocaine 1% without epinephrine  Anesthetic total: 2 mL    Patient sedated: no  Preparation: Patient was prepped and draped in the usual sterile fashion.  Lumbar space: L4-L5 interspace  Patient's position: sitting  Needle gauge: 22  Needle type: spinal  needle - Quincke tip  Needle length: 3.5 in  Number of attempts: 1  Fluid appearance: clear  Tubes of fluid: 4  Total volume: 5 ml  Post-procedure: site cleaned and adhesive bandage applied  Complications: No  Specimens: Yes  Implants: No  Patient tolerance: Patient tolerated the procedure well with no immediate complications      Critical Care    Date/Time: 7/10/2023 4:08 PM  Performed by: Atilio Manriquez MD  Authorized by: Atilio Manriquez MD   Direct patient critical care time: 12 minutes  Additional history critical care time: 5 minutes  Ordering / reviewing critical care time: 10 minutes  Documentation critical care time: 11 minutes  Total critical care time (exclusive of procedural time) : 38 minutes      Labs Reviewed   CBC W/ AUTO DIFFERENTIAL - Abnormal; Notable for the following components:       Result Value    MCH 32.2 (*)     Lymph # 0.9 (*)     Gran % 79.6 (*)     Lymph % 12.0 (*)     All other components within normal limits   COMPREHENSIVE METABOLIC PANEL - Abnormal; Notable for the following components:    Chloride 111 (*)     CO2 20 (*)     Anion Gap 7 (*)     All other components within normal limits   URINALYSIS, REFLEX TO URINE CULTURE - Abnormal; Notable for the following components:    Occult Blood UA 2+ (*)     All other components within normal limits    Narrative:     Specimen Source->Urine   CSF CELL COUNT WITH DIFFERENTIAL - Abnormal; Notable for the following components:    Lymphs, CSF 88 (*)     Mono/Macrophage, CSF 12 (*)     All other components within normal limits   ISTAT LACTATE - Abnormal; Notable for the following components:    POC Lactate 2.23 (*)     All other components within normal limits   CULTURE, CSF  (INCLUDES STAIN)    Narrative:     On which sequentially labeled tube should this analysis be  performed?->2   CULTURE, BLOOD   CULTURE, BLOOD   HIV 1 / 2 ANTIBODY    Narrative:     Release to patient->Immediate   HEPATITIS C ANTIBODY    Narrative:     Release to  patient->Immediate   PROTIME-INR   SARS-COV-2 RNA AMPLIFICATION, QUAL   SARS-COV2 (COVID) WITH FLU/RSV BY PCR   GLUCOSE, CSF   PROTEIN, CSF   URINALYSIS MICROSCOPIC    Narrative:     Specimen Source->Urine   FREEZE AND HOLD -    ISTAT LACTATE        ECG Results              EKG 12-lead (Final result)  Result time 07/10/23 09:34:58      Final result by Interface, Lab In [facility] (07/10/23 09:34:58)                   Narrative:    Test Reason : R07.89,    Vent. Rate : 090 BPM     Atrial Rate : 090 BPM     P-R Int : 124 ms          QRS Dur : 078 ms      QT Int : 332 ms       P-R-T Axes : 060 081 016 degrees     QTc Int : 406 ms    Normal sinus rhythm  Low voltage QRS  Nonspecific T wave abnormality  Abnormal ECG  No previous ECGs available  Confirmed by Daisha NAJERA, Kami ([id number]) on 7/10/2023 9:34:52 AM    Referred By:             Confirmed By:Kami Ashton MD                                  Imaging Results              CT Head Without Contrast (Final result)  Result time 07/10/23 10:40:20      Final result by Jorge Sarabia MD (07/10/23 10:40:20)                   Impression:      No evidence of acute intracranial pathology.    Further workup as warranted clinically.      Electronically signed by: Jorge Sarabia MD  Date:    07/10/2023  Time:    10:40               Narrative:    EXAMINATION:  CT HEAD WITHOUT CONTRAST    CLINICAL HISTORY:  Headache, sudden, severe;    TECHNIQUE:  Low dose axial CT images obtained throughout the head without the use of intravenous contrast.  Axial, sagittal and coronal reconstructions were performed.    Examination mildly degraded by patient motion artifact.    COMPARISON:  None.    FINDINGS:  Intracranial compartment:    Ventricles and sulci are normal in size for age without evidence of hydrocephalus.    The brain parenchyma appears within normal limits.  No parenchymal  hemorrhage, edema, mass effect or major vascular distribution infarct.    No extra-axial blood or fluid  collections.    Skull/extracranial contents (limited evaluation):    No displaced calvarial fracture.    The mastoid air cells and visualized paranasal sinuses are essentially clear.                                       X-Ray Chest AP Portable (Final result)  Result time 07/10/23 10:23:14      Final result by Mina Cole MD (07/10/23 10:23:14)                   Impression:      See above      Electronically signed by: Mina Cole MD  Date:    07/10/2023  Time:    10:23               Narrative:    EXAMINATION:  XR CHEST AP PORTABLE    CLINICAL HISTORY:  Sepsis;    TECHNIQUE:  Single frontal view of the chest was performed.    COMPARISON:  None    FINDINGS:  Heart size normal.  Few calcified granulomas noted in the right perihilar area.  The lungs are clear.  No pleural effusion.                                       Medications   lactated ringers bolus 1,701 mL (0 mLs Intravenous Stopped 7/10/23 1420)   vancomycin 2 g in dextrose 5 % 500 mL IVPB (0 mg Intravenous Stopped 7/10/23 1416)   ceFEPIme (MAXIPIME) 1 g in dextrose 5 % in water (D5W) 5 % 100 mL IVPB (MB+) (0 g Intravenous Stopped 7/10/23 1140)   acetaminophen tablet 1,000 mg (1,000 mg Oral Given 7/10/23 1034)   diphenhydrAMINE injection 12.5 mg (12.5 mg Intravenous Given 7/10/23 1034)   prochlorperazine injection Soln 10 mg (10 mg Intravenous Given 7/10/23 1034)     Medical Decision Making:   Initial Assessment:   This is an emergent evaluation of a potentially critically ill patient.  I do have concern for meningitis along with an underlying bacterial or viral infection.  I had an extensive conversation with the patient about ruling out meningitis.  She is currently refusing lumbar puncture.  However, she does state that she is willing to do it as a last resort if no other infectious source is found.  CT of the head, urinalysis, chest x-ray, EKG, and laboratory studies have been ordered.  I will provide the patient with large fluid volume resuscitation  and broad-spectrum antibiotics.  I will check for flu, influenza, and RSV.  Tylenol will be provided for the patient's fever.  Compazine and Benadryl will be provided for the patient's headache.  I do not believe that the patient's chest pain is cardiac in etiology.  A single troponin has also been ordered along with the screening EKG and chest x-ray.  I do have significant concern for the patient's clinical status.  I will reassess.  ED Management:  9:59 A.M.   Lactic acid is 2.23.  Broad-spectrum antibiotics and large IV fluid resuscitation have already been ordered.  I will give the patient a little bit of time and then rediscuss lumbar puncture again.  I continue to have significant concern for her clinical status.    10:55 a.m.   Other than the mildly elevated lactic acid level, the patient's laboratory studies show no clinically significant abnormalities.  Specifically, the white blood cell count is within normal limits, COVID/RSV/flu is negative, and the patient has no electrolyte derangements.  Other than the incidental findings of a few calcified granulomas noted in the right perihilar area, the chest x-ray is clear.  Head CT shows no significant abnormalities.  The patient has yet to provide a urine sample.  However, she is receiving a large fluid bolus.  I will reassess the patient now.    2:53 p.m.  Influenza was negative.  Repeat lactic acid level is within normal limits.  The patient was consented for lumbar puncture.  The procedure has been performed under sterile technique without complications.  She is lying supine.  Spinal fluid has been sent to the lab.    4:41 p.m.   CSF results show a normal glucose, 0 red blood cells, and only 2 white blood cells.  Findings are not consistent with bacterial meningitis.  The patient states that she is feeling much better.  She denies chest pain.  Her neck is now supple without pain on range of motion.  Her headache has decreased significantly.  Clinically, I  believe that her symptoms are secondary to a significant viral syndrome.  Her blood pressure, temperature, heart rate, respiratory rate, and oxygen saturation are all within normal limits.  At this time, I feel that she is clinically stable for discharge.                        Clinical Impression:   Final diagnoses:  [R07.89] Chest discomfort  [R07.9] Chest pain  [A41.9] Sepsis, due to unspecified organism, unspecified whether acute organ dysfunction present (Primary)  [B34.9] Acute viral syndrome        ED Disposition Condition    Discharge Stable          ED Prescriptions    None       Follow-up Information    None          Atilio Manriquez MD  07/10/23 9947

## 2023-07-10 NOTE — ED NOTES
"  Patient identifiers for Jadon Oconnell 23 y.o. female checked and correct.  Chief Complaint   Patient presents with    Chest Pain     Hx anxiety,      No past medical history on file.  Allergies reported: Review of patient's allergies indicates:  Not on File      HEENT: Denies vision changes. Denies ear drainage or hearing loss. No c/o nasal drainage. Denies dysphagia or voice changes.   Appearance: Pt awake, alert & oriented to person, place & time. Pt in no acute distress at present time. Pt is clean and well groomed with clothes appropriately fastened.   Skin: Skin warm and moist. Color consistent with ethnicity. Mucous membranes moist. No breakdown or brusing noted.   Musculoskeletal: Patient moving all extremities well, no obvious swelling or deformities noted. Weakness noted. Numbness and tingling, "soreness or pain" reported in arms and wrists.   Respiratory: Respirations spontaneous, even, and non-labored. Visible chest rise noted. Airway is open and patent. No accessory muscle use noted. Denies SOB.  Neurologic: Sensation is intact. Speech is clear and appropriate. Eyes open spontaneously, behavior appropriate to situation, follows commands, facial expression symmetrical, bilateral hand grasp equal and even, purposeful motor response noted. HA w/ photophobia and neck stiffness noted.   Cardiac: All peripheral pulses present. No Bilateral lower extremity edema. Cap refill is <3 seconds. Chest tightness noted.  Abdomen: Abdomen soft, non distended, non tender to palpation. Diarrhea reported. Denies N/V.  : Pt voids independently, denies dysuria, hematuria, frequency.    "

## 2023-07-13 ENCOUNTER — TELEPHONE (OUTPATIENT)
Dept: PRIMARY CARE CLINIC | Facility: CLINIC | Age: 23
End: 2023-07-13
Payer: COMMERCIAL

## 2023-07-13 NOTE — TELEPHONE ENCOUNTER
----- Message from Elieser Prince MA sent at 7/13/2023  4:44 PM CDT -----  Contact: [phone]  Patient called                In regards to speak with staff to see if she can be possibly seen on tomorrow for a ED Follow up.            Call back [phone]

## 2023-07-15 LAB
BACTERIA BLD CULT: NORMAL
BACTERIA BLD CULT: NORMAL
BACTERIA CSF CULT: NO GROWTH
GRAM STN SPEC: NORMAL

## 2023-08-28 ENCOUNTER — OFFICE VISIT (OUTPATIENT)
Dept: URGENT CARE | Facility: CLINIC | Age: 23
End: 2023-08-28
Payer: COMMERCIAL

## 2023-08-28 VITALS
OXYGEN SATURATION: 98 % | WEIGHT: 120 LBS | DIASTOLIC BLOOD PRESSURE: 81 MMHG | TEMPERATURE: 99 F | HEART RATE: 71 BPM | RESPIRATION RATE: 18 BRPM | BODY MASS INDEX: 18.83 KG/M2 | HEIGHT: 67 IN | SYSTOLIC BLOOD PRESSURE: 127 MMHG

## 2023-08-28 DIAGNOSIS — R30.0 DYSURIA: ICD-10-CM

## 2023-08-28 DIAGNOSIS — N93.9 VAGINAL BLEEDING: ICD-10-CM

## 2023-08-28 DIAGNOSIS — R10.2 VAGINAL PAIN: Primary | ICD-10-CM

## 2023-08-28 DIAGNOSIS — N76.0 ACUTE VAGINITIS: ICD-10-CM

## 2023-08-28 DIAGNOSIS — R31.29 MICROSCOPIC HEMATURIA: ICD-10-CM

## 2023-08-28 LAB
B-HCG UR QL: NEGATIVE
BILIRUB UR QL STRIP: NEGATIVE
CTP QC/QA: YES
GLUCOSE UR QL STRIP: NEGATIVE
KETONES UR QL STRIP: NEGATIVE
LEUKOCYTE ESTERASE UR QL STRIP: NEGATIVE
PH, POC UA: 7.5 (ref 5–8)
POC BLOOD, URINE: POSITIVE
POC NITRATES, URINE: NEGATIVE
PROT UR QL STRIP: NEGATIVE
SP GR UR STRIP: 1.01 (ref 1–1.03)
UROBILINOGEN UR STRIP-ACNC: NORMAL (ref 0.1–1.1)

## 2023-08-28 PROCEDURE — 87086 URINE CULTURE/COLONY COUNT: CPT | Performed by: PHYSICIAN ASSISTANT

## 2023-08-28 PROCEDURE — 81003 POCT URINALYSIS, DIPSTICK, AUTOMATED, W/O SCOPE: ICD-10-PCS | Mod: QW,S$GLB,, | Performed by: PHYSICIAN ASSISTANT

## 2023-08-28 PROCEDURE — 81003 URINALYSIS AUTO W/O SCOPE: CPT | Mod: QW,S$GLB,, | Performed by: PHYSICIAN ASSISTANT

## 2023-08-28 PROCEDURE — 81514 NFCT DS BV&VAGINITIS DNA ALG: CPT | Performed by: PHYSICIAN ASSISTANT

## 2023-08-28 PROCEDURE — 99214 PR OFFICE/OUTPT VISIT, EST, LEVL IV, 30-39 MIN: ICD-10-PCS | Mod: S$GLB,,, | Performed by: PHYSICIAN ASSISTANT

## 2023-08-28 PROCEDURE — 81025 URINE PREGNANCY TEST: CPT | Mod: S$GLB,,, | Performed by: PHYSICIAN ASSISTANT

## 2023-08-28 PROCEDURE — 99214 OFFICE O/P EST MOD 30 MIN: CPT | Mod: S$GLB,,, | Performed by: PHYSICIAN ASSISTANT

## 2023-08-28 PROCEDURE — 81025 POCT URINE PREGNANCY: ICD-10-PCS | Mod: S$GLB,,, | Performed by: PHYSICIAN ASSISTANT

## 2023-08-28 RX ORDER — FLUCONAZOLE 150 MG/1
150 TABLET ORAL
Qty: 2 TABLET | Refills: 0 | Status: SHIPPED | OUTPATIENT
Start: 2023-08-28 | End: 2023-09-03

## 2023-08-28 NOTE — PROGRESS NOTES
"Subjective:      Patient ID: Jadon Oconnell is a 23 y.o. female.    Vitals:  height is 5' 7" (1.702 m) and weight is 54.4 kg (120 lb). Her temperature is 98.7 °F (37.1 °C). Her blood pressure is 127/81 and her pulse is 71. Her respiration is 18 and oxygen saturation is 98%.     Chief Complaint: Vaginal Pain    Pt presents with chief complaint of vaginal discomfort that began about 1 week ago.  Patient states that 8 days ago she had sexual intercourse for the 1st time about 2 years.  Patient states that it was not very painful during or immediately after, but she noticed vaginal discomfort the following day that has been persistent since onset.  Patient denies vaginal discharge or itching.  No vaginal odor.  Patient states that over the past day she has noticed pain worsened with urination.  Admits to possibly urgency but denies frequency.  No pelvic pain or abdominal pain or fever.  Patient on Depo injection, does not get regular menstrual period.  Has not noticed any spotting or bleeding.        Constitution: Negative for chills, sweating, fatigue and fever.   HENT:  Negative for congestion and sore throat.    Neck: Negative for neck pain and neck stiffness.   Cardiovascular:  Negative for chest pain, leg swelling and palpitations.   Eyes:  Negative for eye itching, eye pain and eye redness.   Respiratory:  Negative for cough, sputum production and shortness of breath.    Gastrointestinal:  Negative for abdominal pain, nausea, vomiting and diarrhea.   Genitourinary:  Positive for dysuria, urgency and vaginal pain. Negative for frequency, flank pain, hematuria, heavy menstrual bleeding, genital trauma, vaginal discharge, vaginal bleeding, vaginal odor, painful intercourse, genital sore and pelvic pain.   Musculoskeletal:  Negative for pain and joint swelling.   Skin:  Negative for color change and rash.   Neurological:  Negative for dizziness, headaches, disorientation, altered mental status, numbness and tingling. "   Psychiatric/Behavioral:  Negative for altered mental status and disorientation.       Objective:     Physical Exam   Constitutional: She is oriented to person, place, and time. She appears well-developed.  Non-toxic appearance. She does not appear ill. No distress.   HENT:   Head: Normocephalic and atraumatic.   Ears:   Right Ear: External ear normal.   Left Ear: External ear normal.   Nose: Nose normal. No nasal deformity. No epistaxis.   Mouth/Throat: Oropharynx is clear and moist and mucous membranes are normal.   Eyes: Lids are normal.   Neck: Trachea normal and phonation normal. Neck supple.   Cardiovascular: Normal pulses.   Pulmonary/Chest: Effort normal.   Abdominal: Normal appearance. She exhibits no distension. Soft. There is no abdominal tenderness. There is no guarding.   Genitourinary:    Vulva normal.   No vaginal rash.    Pelvic exam was performed with patient supine.   There is no rash, tenderness, lesion or injury on the right labia. There is no rash, tenderness, lesion or injury on the left labia.    Vaginal discharge (scant thick white), erythema, tenderness and bleeding present.   There is erythema, tenderness and bleeding in the vagina.    No foreign body in the vagina.      No signs of injury or lesions in the vagina.   Urethra has/is no urethral swelling and no urethral lesion.         Comments: Vulva normal without lesions, swelling, rash, or ttp.   Scant blood in vaginal canal appears to be coming from os.  Vaginal canal erythematous and TTP.  No abrasions or lacerations appreciated. No odor     Neurological: She is alert and oriented to person, place, and time.   Skin: Skin is warm, dry, intact and not diaphoretic.   Psychiatric: Her speech is normal and behavior is normal.   Nursing note and vitals reviewed.chaperone present (bo torres ma)       Results for orders placed or performed in visit on 08/28/23   POCT Urinalysis, Dipstick, Automated, W/O Scope   Result Value Ref Range     POC Blood, Urine Positive (A) Negative    POC Bilirubin, Urine Negative Negative    POC Urobilinogen, Urine normal 0.1 - 1.1    POC Ketones, Urine Negative Negative    POC Protein, Urine Negative Negative    POC Nitrates, Urine Negative Negative    POC Glucose, Urine Negative Negative    pH, UA 7.5 5 - 8    POC Specific Gravity, Urine 1.010 1.003 - 1.029    POC Leukocytes, Urine Negative Negative   POCT urine pregnancy   Result Value Ref Range    POC Preg Test, Ur Negative Negative     Acceptable Yes          Assessment:     1. Dysuria    2. Acute vaginitis    3. Vaginal pain    4. Microscopic hematuria    5. Vaginal bleeding        Plan:     Patient presents with vaginal discomfort and dysuria.  Dysuria likely related to vaginitis, UA with microscopic hematuria, though this appears to be from vaginal bleeding.  In setting of dysuria, urgency, will send for culture to r/o UTI.  Scant amount of vaginal bleeding detected in vaginal canal, though patient denies any spotting or bleeding until now.  As he notes significant signs of injury, trauma, or any abrasions.  No rash, lesions, or signs of HSV appreciated.  Will evaluate for potential infectious etiology of vaginitis, instructed follow-up with OBGYN if symptoms persist, sooner prn new or worsening symptoms.  Dysuria  -     POCT Urinalysis, Dipstick, Automated, W/O Scope  -     POCT urine pregnancy  -     Urine culture    Acute vaginitis  -     VAGINOSIS SCREEN BY DNA PROBE  -     fluconazole (DIFLUCAN) 150 MG Tab; Take 1 tablet (150 mg total) by mouth every 72 hours. for 6 days  Dispense: 2 tablet; Refill: 0    Vaginal pain  -     VAGINOSIS SCREEN BY DNA PROBE  -     C. trachomatis/N. gonorrhoeae by AMP DNA  -     Ambulatory referral/consult to Obstetrics / Gynecology    Microscopic hematuria  -     Urine culture    Vaginal bleeding  -     Ambulatory referral/consult to Obstetrics / Gynecology      Patient Instructions   - Rest.    - Drink plenty of  fluids.    - Acetaminophen (tylenol) or Ibuprofen (advil,motrin) as directed as needed for fever/pain. Avoid tylenol if you have a history of liver disease. Do not take ibuprofen if you have a history of GI bleeding, kidney disease, or if you take blood thinners.   - you can take ibuprofen 600-800 mg every 6-8 hours for pain and inflammation.  You can also take Tylenol/acetaminophen 650-1000 mg every 6-8 hours for added pain relief.    - fluconazole-this is for the treatment of yeast infection.  Take 1 pill once, if symptoms persist you can take another pill in 3 days.    - Follow up with your PCP or specialty clinic as directed in the next 1-2 weeks if not improved or as needed.  You can call [phone] to schedule an appointment with the appropriate provider.    - Go to the ER or seek medical attention immediately if you develop new or worsening symptoms.     - You must understand that you have received an Urgent Care treatment only and that you may be released before all of your medical problems are known or treated.   - You, the patient, will arrange for follow up care as instructed.   - If your condition worsens or fails to improve we recommend that you receive another evaluation at the ER immediately or contact your PCP to discuss your concerns or return here.

## 2023-08-29 LAB
BACTERIA UR CULT: NO GROWTH
BACTERIAL VAGINOSIS DNA: NEGATIVE
CANDIDA GLABRATA DNA: NEGATIVE
CANDIDA KRUSEI DNA: NEGATIVE
CANDIDA RRNA VAG QL PROBE: POSITIVE
T VAGINALIS RRNA GENITAL QL PROBE: NEGATIVE

## 2023-09-01 ENCOUNTER — TELEPHONE (OUTPATIENT)
Dept: URGENT CARE | Facility: CLINIC | Age: 23
End: 2023-09-01
Payer: COMMERCIAL

## 2023-09-20 ENCOUNTER — OFFICE VISIT (OUTPATIENT)
Dept: INTERNAL MEDICINE | Facility: CLINIC | Age: 23
End: 2023-09-20
Payer: COMMERCIAL

## 2023-09-20 VITALS
HEART RATE: 90 BPM | HEIGHT: 67 IN | WEIGHT: 130.94 LBS | SYSTOLIC BLOOD PRESSURE: 114 MMHG | OXYGEN SATURATION: 99 % | BODY MASS INDEX: 20.55 KG/M2 | DIASTOLIC BLOOD PRESSURE: 76 MMHG

## 2023-09-20 DIAGNOSIS — F41.9 ANXIETY: ICD-10-CM

## 2023-09-20 DIAGNOSIS — Z86.59 HISTORY OF DEPRESSION: ICD-10-CM

## 2023-09-20 DIAGNOSIS — R41.840 POOR CONCENTRATION: Primary | ICD-10-CM

## 2023-09-20 DIAGNOSIS — R07.89 CHEST WALL PAIN: ICD-10-CM

## 2023-09-20 PROCEDURE — 99999 PR PBB SHADOW E&M-EST. PATIENT-LVL IV: ICD-10-PCS | Mod: PBBFAC,,, | Performed by: PHYSICIAN ASSISTANT

## 2023-09-20 PROCEDURE — 1160F RVW MEDS BY RX/DR IN RCRD: CPT | Mod: CPTII,S$GLB,, | Performed by: PHYSICIAN ASSISTANT

## 2023-09-20 PROCEDURE — 3044F HG A1C LEVEL LT 7.0%: CPT | Mod: CPTII,S$GLB,, | Performed by: PHYSICIAN ASSISTANT

## 2023-09-20 PROCEDURE — 1159F MED LIST DOCD IN RCRD: CPT | Mod: CPTII,S$GLB,, | Performed by: PHYSICIAN ASSISTANT

## 2023-09-20 PROCEDURE — 3074F SYST BP LT 130 MM HG: CPT | Mod: CPTII,S$GLB,, | Performed by: PHYSICIAN ASSISTANT

## 2023-09-20 PROCEDURE — 3008F BODY MASS INDEX DOCD: CPT | Mod: CPTII,S$GLB,, | Performed by: PHYSICIAN ASSISTANT

## 2023-09-20 PROCEDURE — 1160F PR REVIEW ALL MEDS BY PRESCRIBER/CLIN PHARMACIST DOCUMENTED: ICD-10-PCS | Mod: CPTII,S$GLB,, | Performed by: PHYSICIAN ASSISTANT

## 2023-09-20 PROCEDURE — 3078F PR MOST RECENT DIASTOLIC BLOOD PRESSURE < 80 MM HG: ICD-10-PCS | Mod: CPTII,S$GLB,, | Performed by: PHYSICIAN ASSISTANT

## 2023-09-20 PROCEDURE — 3078F DIAST BP <80 MM HG: CPT | Mod: CPTII,S$GLB,, | Performed by: PHYSICIAN ASSISTANT

## 2023-09-20 PROCEDURE — 99999 PR PBB SHADOW E&M-EST. PATIENT-LVL IV: CPT | Mod: PBBFAC,,, | Performed by: PHYSICIAN ASSISTANT

## 2023-09-20 PROCEDURE — 99214 OFFICE O/P EST MOD 30 MIN: CPT | Mod: S$GLB,,, | Performed by: PHYSICIAN ASSISTANT

## 2023-09-20 PROCEDURE — 99214 PR OFFICE/OUTPT VISIT, EST, LEVL IV, 30-39 MIN: ICD-10-PCS | Mod: S$GLB,,, | Performed by: PHYSICIAN ASSISTANT

## 2023-09-20 PROCEDURE — 3008F PR BODY MASS INDEX (BMI) DOCUMENTED: ICD-10-PCS | Mod: CPTII,S$GLB,, | Performed by: PHYSICIAN ASSISTANT

## 2023-09-20 PROCEDURE — 3074F PR MOST RECENT SYSTOLIC BLOOD PRESSURE < 130 MM HG: ICD-10-PCS | Mod: CPTII,S$GLB,, | Performed by: PHYSICIAN ASSISTANT

## 2023-09-20 PROCEDURE — 3044F PR MOST RECENT HEMOGLOBIN A1C LEVEL <7.0%: ICD-10-PCS | Mod: CPTII,S$GLB,, | Performed by: PHYSICIAN ASSISTANT

## 2023-09-20 PROCEDURE — 1159F PR MEDICATION LIST DOCUMENTED IN MEDICAL RECORD: ICD-10-PCS | Mod: CPTII,S$GLB,, | Performed by: PHYSICIAN ASSISTANT

## 2023-09-20 RX ORDER — BUPROPION HYDROCHLORIDE 150 MG/1
150 TABLET ORAL DAILY
Qty: 30 TABLET | Refills: 1 | Status: SHIPPED | OUTPATIENT
Start: 2023-09-20 | End: 2024-09-19

## 2023-09-20 NOTE — PATIENT INSTRUCTIONS
Voltaren gel (ointment) or NSAID for 1 to 2 weeks to try for chest wall pain    Send me the accommodation form    Consider therapy again.

## 2023-09-20 NOTE — PROGRESS NOTES
"Subjective     Patient ID: Jadno Oconnell is a 23 y.o. female.    Chief Complaint: trouble focusing     HPI    Established pt of No, Primary Doctor     Here with concerns with trouble focusing, easily distracted, during school. 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 Grades are dropping. Has met with professors, changed study habits but concentration/focus continue to be difficult. She reports small noises like a cough/sneeze interrupts her focus. She has anxiety/depression, tried lexapro and zoloft in the past, prev saw a therapist. Reports moods is stable, no longer on SSRI(made her feel numb), takes trazodone prn for sleep. Reports mother jokingly said she ahd ADD as a younger child.     Also reports chest wall pain, chronic for several months. Extensive workup in ED for fever/viral syndrome in July (CXR, EKG, troponin) unremarkable.     Past Medical History:   Diagnosis Date    Anxiety     Depression      Social History     Tobacco Use    Smoking status: Never    Smokeless tobacco: Never   Substance Use Topics    Alcohol use: Yes     Comment: socially    Drug use: Never     Review of patient's allergies indicates:  No Known Allergies    Review of Systems   Constitutional:  Negative for chills, fever and unexpected weight change.   Respiratory:  Negative for cough and shortness of breath.    Cardiovascular:  Negative for leg swelling.   Gastrointestinal:  Negative for abdominal pain, nausea and vomiting.   Musculoskeletal:  Positive for arthralgias (chest wall pain).   Integumentary:  Negative for rash.   Neurological:  Negative for weakness and headaches.   Psychiatric/Behavioral:  Positive for decreased concentration.           Objective  /76 (BP Location: Right arm, Patient Position: Sitting, BP Method: Medium (Manual))   Pulse 90   Ht 5' 7" (1.702 m)   Wt 59.4 kg (130 lb 15.3 oz)   SpO2 99%   BMI 20.51 kg/m²       Physical Exam  Vitals reviewed.   Constitutional:       General: She " is not in acute distress.     Appearance: She is well-developed.   HENT:      Head: Normocephalic and atraumatic.      Right Ear: Tympanic membrane, ear canal and external ear normal.      Left Ear: Tympanic membrane, ear canal and external ear normal.   Cardiovascular:      Rate and Rhythm: Normal rate and regular rhythm.      Heart sounds: No murmur heard.  Pulmonary:      Effort: Pulmonary effort is normal.      Breath sounds: Normal breath sounds. No wheezing or rales.   Chest:       Abdominal:      General: Bowel sounds are normal.      Palpations: Abdomen is soft.      Tenderness: There is no abdominal tenderness.   Musculoskeletal:      Right lower leg: No edema.      Left lower leg: No edema.   Skin:     General: Skin is warm and dry.      Findings: No rash.   Neurological:      Mental Status: She is alert.   Psychiatric:         Mood and Affect: Mood normal.            Assessment and Plan     1. Poor concentration  2. Anxiety  3. History of depression  Recommend to resume therapy/counseling  Recommend appt with psychology for formal ADD eval  Trial of Wellbutrin (med profile reviewed)  Letter for school accommodations.  -     Ambulatory referral/consult to Psychology; Future; Expected date: 09/27/2023  -     buPROPion (WELLBUTRIN XL) 150 MG TB24 tablet; Take 1 tablet (150 mg total) by mouth once daily.  Dispense: 30 tablet; Refill: 1    4. Chest wall pain  Advised on symptomatic care  Notify clinic if symptoms worsen or fail to improve      RTC in 1 mo  Advised PCP f/u  Hien Coombs PA-C

## 2023-09-20 NOTE — LETTER
September 21, 2023      [facility]  [address]  [facility] [address]  Phone: [phone]  Fax: [phone]       Patient: Jadon Oconnell   YOB: 2000  Date of Visit: 09/21/2023    To Whom It May Concern:    Prakash Oconnell  was at [facility] on 09/20/2023. She has been under my care for her anxiety, depression and focus/concentration issues which impact her ability to perform optimally in standard testing environments. Please consider accommodations during testing that include a quite private testing room to minimize distractions and reduce anxiety.     If you have any questions or concerns, or if I can be of further assistance, please do not hesitate to contact me.    Sincerely,        Hien Coombs PA-C

## 2023-10-17 ENCOUNTER — TELEPHONE (OUTPATIENT)
Dept: INTERNAL MEDICINE | Facility: CLINIC | Age: 23
End: 2023-10-17
Payer: COMMERCIAL

## 2023-10-17 NOTE — TELEPHONE ENCOUNTER
----- Message from Claudiaeriberto Dawn sent at 10/17/2023  2:59 PM CDT -----  Contact: Jadon [phone]  Returning a phone call.    Who left a message for the patient:  Gisela Laird MA    Do they know what this is regarding:  yes    Would they like a phone call back or a response via [facility]:   call back

## 2023-10-17 NOTE — TELEPHONE ENCOUNTER
----- Message from Gideon Lord sent at 10/16/2023  4:20 PM CDT -----  Type:  Patient Returning Call    Who Called:pt  Who Left Message for Patient:  Does the patient know what this is regarding?:psych referral  Would the patient rather a call back or a response via [facility]? Call  Best Call Back Number:[phone]  Additional Information: pt states she would like to speak with someone in office in regards to psych referral

## 2024-08-20 ENCOUNTER — OFFICE VISIT (OUTPATIENT)
Dept: OBSTETRICS AND GYNECOLOGY | Facility: CLINIC | Age: 24
End: 2024-08-20
Attending: OBSTETRICS & GYNECOLOGY
Payer: COMMERCIAL

## 2024-08-20 VITALS
HEIGHT: 67 IN | WEIGHT: 132 LBS | DIASTOLIC BLOOD PRESSURE: 75 MMHG | BODY MASS INDEX: 20.72 KG/M2 | SYSTOLIC BLOOD PRESSURE: 112 MMHG

## 2024-08-20 DIAGNOSIS — Z30.09 GENERAL COUNSELING AND ADVICE ON FEMALE CONTRACEPTION: Primary | ICD-10-CM

## 2024-08-20 DIAGNOSIS — Z11.3 VENEREAL DISEASE SCREENING: ICD-10-CM

## 2024-08-20 PROCEDURE — 87591 N.GONORRHOEAE DNA AMP PROB: CPT | Performed by: OBSTETRICS & GYNECOLOGY

## 2024-08-20 PROCEDURE — 99999 PR PBB SHADOW E&M-EST. PATIENT-LVL III: CPT | Mod: PBBFAC,,, | Performed by: OBSTETRICS & GYNECOLOGY

## 2024-08-20 PROCEDURE — 3078F DIAST BP <80 MM HG: CPT | Mod: CPTII,S$GLB,, | Performed by: OBSTETRICS & GYNECOLOGY

## 2024-08-20 PROCEDURE — 99203 OFFICE O/P NEW LOW 30 MIN: CPT | Mod: S$GLB,,, | Performed by: OBSTETRICS & GYNECOLOGY

## 2024-08-20 PROCEDURE — 87491 CHLMYD TRACH DNA AMP PROBE: CPT | Performed by: OBSTETRICS & GYNECOLOGY

## 2024-08-20 PROCEDURE — 3008F BODY MASS INDEX DOCD: CPT | Mod: CPTII,S$GLB,, | Performed by: OBSTETRICS & GYNECOLOGY

## 2024-08-20 PROCEDURE — 1159F MED LIST DOCD IN RCRD: CPT | Mod: CPTII,S$GLB,, | Performed by: OBSTETRICS & GYNECOLOGY

## 2024-08-20 PROCEDURE — 3074F SYST BP LT 130 MM HG: CPT | Mod: CPTII,S$GLB,, | Performed by: OBSTETRICS & GYNECOLOGY

## 2024-08-20 PROCEDURE — 1160F RVW MEDS BY RX/DR IN RCRD: CPT | Mod: CPTII,S$GLB,, | Performed by: OBSTETRICS & GYNECOLOGY

## 2024-08-20 RX ORDER — HYDROCORTISONE 25 MG/G
CREAM TOPICAL
COMMUNITY
Start: 2024-05-09

## 2024-08-20 RX ORDER — DESOGESTREL AND ETHINYL ESTRADIOL 21-5 (28)
1 KIT ORAL DAILY
Qty: 30 TABLET | Refills: 11 | Status: SHIPPED | OUTPATIENT
Start: 2024-08-20 | End: 2025-08-20

## 2024-08-20 NOTE — PROGRESS NOTES
Subjective:       Patient ID: Jadon Oconnell is a 24 y.o. female.    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 Patient presents for contraception counseling. The patient has no complaints today. The patient is not currently sexually active. Pertinent past medical history: none. She has not been sexually active since 2023, states did not use condoms. Ok with STD testing.     Menarche age 17; cycles were regular, but crampy.   She has had her Gardasil Vaccine Series  States was on OCP's since about age 19, then switched to Depo Provera injections a few years ago for ease of use as she sometimes found it difficult to remember taking pills daily , but has continued to have acne. Not currently sexually active, and last injection was in 2023, but has not had a menstrual cycle in several years. Reports that her primary concern is improvement in her acne.     GYN & OB History  No LMP recorded (lmp unknown). (Menstrual status: Birth Control).   Date of Last Pap:  at [employer] [facility] [employer], Normal, Neg HPV    OB History    Para Term  AB Living   0 0 0 0 0 0   SAB IAB Ectopic Multiple Live Births   0 0 0 0 0       Past Medical History:   Diagnosis Date    Anxiety     Depression        History reviewed. No pertinent surgical history.    Review of Systems  Review of Systems   Constitutional:  Negative for activity change, appetite change, fatigue and unexpected weight change.   HENT: Negative.     Eyes:  Negative for visual disturbance.   Respiratory:  Negative for shortness of breath and wheezing.    Cardiovascular:  Negative for chest pain, palpitations and leg swelling.   Gastrointestinal:  Negative for abdominal pain, bloating and blood in stool.   Endocrine: Negative for diabetes and hair loss.   Genitourinary:  Negative for decreased libido, dyspareunia,  "menorrhagia, menstrual problem and vaginal discharge.   Musculoskeletal:  Negative for back pain and joint swelling.   Integumentary:  Negative for acne, hair changes and nipple discharge.   Neurological:  Negative for headaches.   Hematological:  Does not bruise/bleed easily.   Psychiatric/Behavioral:  Negative for depression and sleep disturbance. The patient is not nervous/anxious.    Breast: Negative for mastodynia and nipple discharge          Objective:      /75   Ht 5' 7" (1.702 m)   Wt 59.9 kg (132 lb)   LMP  (LMP Unknown) Comment: last depo 11/2023  BMI 20.67 kg/m²   Physical Exam:               Genitourinary:    Pelvic exam was performed with patient supine.      Genitourinary Comments: PELVIC: Normal external genitalia without lesions.  Normal hair distribution.  Adequate perineal body, normal urethral meatus.  Vagina moist and well rugated without lesions or discharge.  Cervix pink, Nulliparous,without lesions, discharge or tenderness.  No significant cystocele or rectocele.  Bimanual exam shows uterus to be normal size, regular, mobile and nontender.  Adnexa without masses or tenderness.                                  Assessment:        1. General counseling and advice on female contraception    2. Venereal disease screening                Plan:      Problem List Items Addressed This Visit    None  Visit Diagnoses       General counseling and advice on female contraception    -  Primary  Patient was counseled today on contraceptive options--Pills, Depo-Provera, IUD, Nexplanon, & Nuva Ring. We discussed the advantages and disadvantages of each. We discussed the continued use of condoms to prevent STDs.   -The use of hormonal contraception as well as other contraception options has been fully discussed with the patient. We discussed all options including OCPs, transdermal patches, vaginal ring, Depo Provera injections, Nexplanon, and IUDs. Warnings about anticipated minor side effects such as " breakthrough spotting, nausea, breast tenderness, weight changes, acne, headaches, etc were given.  She has been told of the more serious potential side effects such as MI, stroke, and deep vein thrombosis, all of which are very unlikely.  She has been asked to report any signs of such serious problems immediately. The need for additional protection, such as a condom, to prevent exposure to sexually transmitted diseases has also been discussed- the patient has been clearly reminded that no hormonal contraceptive method can protect her against diseases such as HIV and others.  All of her questions were answered. Total duration face to face visit time 25 minutes of which more than 50% was spent in counseling..     Counseling included discussion of treatment options and risks and benefits.      Relevant Medications    desog-e.estradioL/e.estradioL (KARIVA) 0.15-0.02 mgx21 /0.01 mg x 5 per tablet    Venereal disease screening        Relevant Orders    C. trachomatis/N. gonorrhoeae by AMP DNA             Follow up in about 1 year (around 8/20/2025).

## 2024-08-21 LAB
C TRACH DNA SPEC QL NAA+PROBE: NOT DETECTED
N GONORRHOEA DNA SPEC QL NAA+PROBE: NOT DETECTED

## 2025-07-10 DIAGNOSIS — Z30.09 GENERAL COUNSELING AND ADVICE ON FEMALE CONTRACEPTION: ICD-10-CM

## 2025-07-10 RX ORDER — DESOGESTREL AND ETHINYL ESTRADIOL 21-5 (28)
1 KIT ORAL DAILY
Qty: 84 TABLET | Refills: 0 | Status: SHIPPED | OUTPATIENT
Start: 2025-07-10

## 2025-07-10 NOTE — TELEPHONE ENCOUNTER
Refill Decision Note   Jadon Oconnell  is requesting a refill authorization.    Brief Assessment and Rationale for Refill:  Approve       Medication Therapy Plan:         Comments:     Note composed:10:19 AM 07/10/2025